# Patient Record
Sex: MALE | Race: WHITE | NOT HISPANIC OR LATINO | URBAN - METROPOLITAN AREA
[De-identification: names, ages, dates, MRNs, and addresses within clinical notes are randomized per-mention and may not be internally consistent; named-entity substitution may affect disease eponyms.]

---

## 2020-06-17 ENCOUNTER — WEB ENCOUNTER (OUTPATIENT)
Dept: URBAN - METROPOLITAN AREA CLINIC 92 | Facility: CLINIC | Age: 25
End: 2020-06-17

## 2020-06-17 ENCOUNTER — LAB OUTSIDE AN ENCOUNTER (OUTPATIENT)
Dept: URBAN - METROPOLITAN AREA CLINIC 92 | Facility: CLINIC | Age: 25
End: 2020-06-17

## 2020-06-17 ENCOUNTER — OFFICE VISIT (OUTPATIENT)
Dept: URBAN - METROPOLITAN AREA CLINIC 92 | Facility: CLINIC | Age: 25
End: 2020-06-17
Payer: COMMERCIAL

## 2020-06-17 DIAGNOSIS — K21.9 GERD: ICD-10-CM

## 2020-06-17 PROCEDURE — 99214 OFFICE O/P EST MOD 30 MIN: CPT | Performed by: INTERNAL MEDICINE

## 2020-06-17 PROCEDURE — G8427 DOCREV CUR MEDS BY ELIG CLIN: HCPCS | Performed by: INTERNAL MEDICINE

## 2020-06-17 PROCEDURE — G8417 CALC BMI ABV UP PARAM F/U: HCPCS | Performed by: INTERNAL MEDICINE

## 2020-06-17 PROCEDURE — 1036F TOBACCO NON-USER: CPT | Performed by: INTERNAL MEDICINE

## 2020-06-17 RX ORDER — OMEPRAZOLE 40 MG/1
1 CAPSULE 30 MINUTES BEFORE MORNING MEAL CAPSULE, DELAYED RELEASE ORAL ONCE A DAY
Qty: 30 | OUTPATIENT
Start: 2020-06-17

## 2020-06-17 RX ORDER — PANTOPRAZOLE SODIUM 40 MG/1
1 TABLET TABLET, DELAYED RELEASE ORAL ONCE A DAY
Status: ACTIVE | COMMUNITY

## 2020-06-17 RX ORDER — FEXOFENADINE HYDROCHLORIDE 60 MG/1
1 TABLET AS NEEDED TABLET, FILM COATED ORAL TWICE A DAY
Status: ACTIVE | COMMUNITY

## 2020-06-17 RX ORDER — SERTRALINE HYDROCHLORIDE 100 MG/1
1 TABLET TABLET ORAL ONCE A DAY
Status: ACTIVE | COMMUNITY

## 2020-06-17 NOTE — PHYSICAL EXAM GASTROINTESTINAL
Abdomen, soft, nontender, nondistended, no guarding or rigidity, no masses palpable, normal bowel sounds, Liver and Spleen, no hepatomegaly present, no hepatosplenomegaly, liver nontender, spleen not palpable, Self Exam: Abdomen soft, non-tender to palpatation, non-distended

## 2020-06-17 NOTE — HPI-TODAY'S VISIT:
This is a 24-year-old male who now presents for evaluation of GERD.    Patient has longstanding reflux symptoms since age 14.  He has tried multiple PPI in the past but has been on Protonix 40 mg daily with good control of reflux symptoms.  Recently he has noticed frequent breakthrough symptoms 2 to 3 times a week but denies any odynophagia or dysphagia.  There is no abdominal pain, N/V, hematemesis, melena, or hematochezia.  Denies any fever, chills, or constitutional symptoms including unintentional weight loss.

## 2020-06-30 ENCOUNTER — OFFICE VISIT (OUTPATIENT)
Dept: URBAN - METROPOLITAN AREA SURGERY CENTER 16 | Facility: SURGERY CENTER | Age: 25
End: 2020-06-30
Payer: COMMERCIAL

## 2020-06-30 DIAGNOSIS — K29.60 ADENOPAPILLOMATOSIS GASTRICA: ICD-10-CM

## 2020-06-30 DIAGNOSIS — K31.7 BENIGN GASTRIC POLYP: ICD-10-CM

## 2020-06-30 DIAGNOSIS — K21.0 BILE REFLUX ESOPHAGITIS: ICD-10-CM

## 2020-06-30 PROCEDURE — G8907 PT DOC NO EVENTS ON DISCHARG: HCPCS | Performed by: INTERNAL MEDICINE

## 2020-06-30 PROCEDURE — 43239 EGD BIOPSY SINGLE/MULTIPLE: CPT | Performed by: INTERNAL MEDICINE

## 2020-06-30 RX ORDER — PANTOPRAZOLE SODIUM 40 MG/1
1 TABLET TABLET, DELAYED RELEASE ORAL ONCE A DAY
Status: ACTIVE | COMMUNITY

## 2020-06-30 RX ORDER — OMEPRAZOLE 40 MG/1
1 CAPSULE 30 MINUTES BEFORE MORNING MEAL CAPSULE, DELAYED RELEASE ORAL ONCE A DAY
Qty: 30 | Status: ACTIVE | COMMUNITY
Start: 2020-06-17

## 2020-06-30 RX ORDER — FEXOFENADINE HYDROCHLORIDE 60 MG/1
1 TABLET AS NEEDED TABLET, FILM COATED ORAL TWICE A DAY
Status: ACTIVE | COMMUNITY

## 2020-06-30 RX ORDER — SERTRALINE HYDROCHLORIDE 100 MG/1
1 TABLET TABLET ORAL ONCE A DAY
Status: ACTIVE | COMMUNITY

## 2020-07-10 ENCOUNTER — ERX REFILL RESPONSE (OUTPATIENT)
Dept: URBAN - METROPOLITAN AREA CLINIC 92 | Facility: CLINIC | Age: 25
End: 2020-07-10

## 2020-07-10 RX ORDER — OMEPRAZOLE 40 MG/1
1 CAPSULE 30 MINUTES BEFORE MORNING MEAL CAPSULE, DELAYED RELEASE ORAL ONCE A DAY
Qty: 30 | Refills: 0 | OUTPATIENT

## 2020-07-10 RX ORDER — OMEPRAZOLE 40 MG/1
1 CAPSULE 30 MINUTES BEFORE MORNING MEAL ONCE A DAY ORALLY 30 DAY(S) CAPSULE, DELAYED RELEASE ORAL
Qty: 90 CAPSULE | Refills: 0 | OUTPATIENT

## 2020-07-22 ENCOUNTER — OFFICE VISIT (OUTPATIENT)
Dept: URBAN - METROPOLITAN AREA CLINIC 92 | Facility: CLINIC | Age: 25
End: 2020-07-22
Payer: COMMERCIAL

## 2020-07-22 VITALS
BODY MASS INDEX: 30.34 KG/M2 | HEART RATE: 91 BPM | WEIGHT: 224 LBS | DIASTOLIC BLOOD PRESSURE: 93 MMHG | HEIGHT: 72 IN | TEMPERATURE: 94.5 F | SYSTOLIC BLOOD PRESSURE: 144 MMHG

## 2020-07-22 DIAGNOSIS — K21.9 GERD: ICD-10-CM

## 2020-07-22 PROCEDURE — 99213 OFFICE O/P EST LOW 20 MIN: CPT | Performed by: INTERNAL MEDICINE

## 2020-07-22 PROCEDURE — G8417 CALC BMI ABV UP PARAM F/U: HCPCS | Performed by: INTERNAL MEDICINE

## 2020-07-22 PROCEDURE — 1036F TOBACCO NON-USER: CPT | Performed by: INTERNAL MEDICINE

## 2020-07-22 PROCEDURE — G8427 DOCREV CUR MEDS BY ELIG CLIN: HCPCS | Performed by: INTERNAL MEDICINE

## 2020-07-22 RX ORDER — OMEPRAZOLE 40 MG/1
1 CAPSULE 30 MINUTES BEFORE MORNING MEAL CAPSULE, DELAYED RELEASE ORAL ONCE A DAY
Qty: 90 | Refills: 3 | OUTPATIENT
Start: 2020-07-22

## 2020-07-22 RX ORDER — OMEPRAZOLE 40 MG/1
1 CAPSULE 30 MINUTES BEFORE MORNING MEAL ONCE A DAY ORALLY 30 DAY(S) CAPSULE, DELAYED RELEASE ORAL
Qty: 90 CAPSULE | Refills: 0 | Status: ACTIVE | COMMUNITY

## 2020-07-22 RX ORDER — SERTRALINE HYDROCHLORIDE 100 MG/1
1 TABLET TABLET ORAL ONCE A DAY
Status: ON HOLD | COMMUNITY

## 2020-07-22 RX ORDER — FEXOFENADINE HYDROCHLORIDE 60 MG/1
1 TABLET AS NEEDED TABLET, FILM COATED ORAL TWICE A DAY
Status: ACTIVE | COMMUNITY

## 2020-07-22 RX ORDER — PANTOPRAZOLE SODIUM 40 MG/1
1 TABLET TABLET, DELAYED RELEASE ORAL ONCE A DAY
Status: ON HOLD | COMMUNITY

## 2020-07-22 RX ORDER — SERTRALINE HYDROCHLORIDE 100 MG/1
1 TABLET TABLET, FILM COATED ORAL ONCE A DAY
Status: ACTIVE | COMMUNITY

## 2020-07-22 NOTE — HPI-TODAY'S VISIT:
This is a 24-year-old male who now presents for follow-up after EGD  Patient has longstanding reflux symptoms since age 14.  He has tried multiple PPI in the past but has been on Protonix 40 mg daily with good control of reflux symptoms .  He was recently seen for frequent breakthrough symptoms.  EGD on 06/30/2020 showed irregular GE junction and the biopsy showed reflux esophagitis.  There were fundic gland polyps and chronic gastritis without H pylori.  He has been on Prilosec 40 mg daily, before dinner with good control of reflux symptoms.

## 2021-05-14 ENCOUNTER — OFFICE VISIT (OUTPATIENT)
Dept: URBAN - METROPOLITAN AREA CLINIC 92 | Facility: CLINIC | Age: 26
End: 2021-05-14
Payer: COMMERCIAL

## 2021-05-14 DIAGNOSIS — K21.9 GERD: ICD-10-CM

## 2021-05-14 DIAGNOSIS — R06.6 HICCUPS: ICD-10-CM

## 2021-05-14 PROCEDURE — 99214 OFFICE O/P EST MOD 30 MIN: CPT | Performed by: INTERNAL MEDICINE

## 2021-05-14 RX ORDER — SERTRALINE HYDROCHLORIDE 100 MG/1
1 TABLET TABLET, FILM COATED ORAL ONCE A DAY
Status: ACTIVE | COMMUNITY

## 2021-05-14 RX ORDER — PANTOPRAZOLE SODIUM 40 MG/1
1 TABLET TABLET, DELAYED RELEASE ORAL ONCE A DAY
Status: ON HOLD | COMMUNITY

## 2021-05-14 RX ORDER — OMEPRAZOLE 40 MG/1
1 CAPSULE 30 MINUTES BEFORE MORNING MEAL ONCE A DAY ORALLY 30 DAY(S) CAPSULE, DELAYED RELEASE ORAL
Qty: 90 CAPSULE | Refills: 0 | Status: ACTIVE | COMMUNITY

## 2021-05-14 RX ORDER — OMEPRAZOLE 40 MG/1
1 CAPSULE 30 MINUTES BEFORE MORNING MEAL CAPSULE, DELAYED RELEASE ORAL ONCE A DAY
Qty: 90 | Refills: 3 | Status: ACTIVE | COMMUNITY
Start: 2020-07-22

## 2021-05-14 RX ORDER — FEXOFENADINE HYDROCHLORIDE 60 MG/1
1 TABLET AS NEEDED TABLET, FILM COATED ORAL TWICE A DAY
Status: ON HOLD | COMMUNITY

## 2021-05-14 RX ORDER — SERTRALINE HYDROCHLORIDE 100 MG/1
1 TABLET TABLET ORAL ONCE A DAY
Status: ON HOLD | COMMUNITY

## 2021-05-14 RX ORDER — OMEPRAZOLE 40 MG/1
1 CAPSULE 30 MINUTES BEFORE MORNING MEAL CAPSULE, DELAYED RELEASE ORAL ONCE A DAY
Qty: 90 | Refills: 3 | OUTPATIENT
Start: 2021-05-14

## 2021-05-14 NOTE — HPI-TODAY'S VISIT:
This is a 24-year-old male who now presents for follow-up after EGD  Patient has longstanding reflux symptoms since age 14.  He has tried multiple PPI in the past but has been on Protonix 40 mg daily .  Because he recently had 1 breakthrough symptoms on the medication, I transitioned to Prilosec 40 mg daily.  With this current medication, he has good control of reflux symptoms.  EGD on 06/30/2020 showed irregular GE junction and the biopsy showed reflux esophagitis.  There were fundic gland polyps and chronic gastritis without H pylori.      Patient reports having intermittent intractable hiccups once or twice a month that is often times triggered by alcohol.

## 2021-06-07 ENCOUNTER — TELEPHONE ENCOUNTER (OUTPATIENT)
Dept: URBAN - METROPOLITAN AREA CLINIC 92 | Facility: CLINIC | Age: 26
End: 2021-06-07

## 2021-06-18 ENCOUNTER — OFFICE VISIT (OUTPATIENT)
Dept: URBAN - METROPOLITAN AREA CLINIC 92 | Facility: CLINIC | Age: 26
End: 2021-06-18

## 2021-06-18 RX ORDER — OMEPRAZOLE 40 MG/1
1 CAPSULE 30 MINUTES BEFORE MORNING MEAL CAPSULE, DELAYED RELEASE ORAL ONCE A DAY
Qty: 90 | Refills: 3 | Status: ACTIVE | COMMUNITY
Start: 2020-07-22

## 2021-06-18 RX ORDER — PANTOPRAZOLE SODIUM 40 MG/1
1 TABLET TABLET, DELAYED RELEASE ORAL ONCE A DAY
Status: ON HOLD | COMMUNITY

## 2021-06-18 RX ORDER — SERTRALINE HYDROCHLORIDE 100 MG/1
1 TABLET TABLET ORAL ONCE A DAY
Status: ON HOLD | COMMUNITY

## 2021-06-18 RX ORDER — OMEPRAZOLE 40 MG/1
1 CAPSULE 30 MINUTES BEFORE MORNING MEAL ONCE A DAY ORALLY 30 DAY(S) CAPSULE, DELAYED RELEASE ORAL
Qty: 90 CAPSULE | Refills: 0 | Status: ACTIVE | COMMUNITY

## 2021-06-18 RX ORDER — FEXOFENADINE HYDROCHLORIDE 60 MG/1
1 TABLET AS NEEDED TABLET, FILM COATED ORAL TWICE A DAY
Status: ON HOLD | COMMUNITY

## 2021-06-18 RX ORDER — SERTRALINE HYDROCHLORIDE 100 MG/1
1 TABLET TABLET, FILM COATED ORAL ONCE A DAY
Status: ACTIVE | COMMUNITY

## 2021-06-18 RX ORDER — OMEPRAZOLE 40 MG/1
1 CAPSULE 30 MINUTES BEFORE MORNING MEAL CAPSULE, DELAYED RELEASE ORAL ONCE A DAY
Qty: 90 | Refills: 3 | Status: ACTIVE | COMMUNITY
Start: 2021-05-14

## 2021-07-22 ENCOUNTER — OFFICE VISIT (OUTPATIENT)
Dept: URBAN - METROPOLITAN AREA CLINIC 92 | Facility: CLINIC | Age: 26
End: 2021-07-22

## 2023-01-26 ENCOUNTER — OFFICE VISIT (OUTPATIENT)
Dept: URBAN - METROPOLITAN AREA CLINIC 92 | Facility: CLINIC | Age: 28
End: 2023-01-26
Payer: COMMERCIAL

## 2023-01-26 VITALS
WEIGHT: 211 LBS | HEART RATE: 122 BPM | TEMPERATURE: 97.7 F | HEIGHT: 72 IN | SYSTOLIC BLOOD PRESSURE: 144 MMHG | DIASTOLIC BLOOD PRESSURE: 98 MMHG | BODY MASS INDEX: 28.58 KG/M2

## 2023-01-26 DIAGNOSIS — R11.0 NAUSEA: ICD-10-CM

## 2023-01-26 DIAGNOSIS — K21.9 GERD: ICD-10-CM

## 2023-01-26 PROCEDURE — 99214 OFFICE O/P EST MOD 30 MIN: CPT | Performed by: PHYSICIAN ASSISTANT

## 2023-01-26 RX ORDER — IBUPROFEN 800 MG/1
1 TABLET WITH FOOD OR MILK AS NEEDED TABLET, FILM COATED ORAL
Status: ACTIVE | COMMUNITY

## 2023-01-26 RX ORDER — SUCRALFATE 1 G/10ML
10 ML ON AN EMPTY STOMACH SUSPENSION ORAL
Qty: 1200 MILLILITER | Refills: 3 | OUTPATIENT
Start: 2023-01-26 | End: 2023-05-26

## 2023-01-26 RX ORDER — PANTOPRAZOLE SODIUM 40 MG/1
1 TABLET TABLET, DELAYED RELEASE ORAL ONCE A DAY
Status: ON HOLD | COMMUNITY

## 2023-01-26 RX ORDER — OMEPRAZOLE 40 MG/1
1 CAPSULE 30 MINUTES BEFORE MORNING MEAL CAPSULE, DELAYED RELEASE ORAL ONCE A DAY
Qty: 90 | Refills: 3 | OUTPATIENT

## 2023-01-26 RX ORDER — FEXOFENADINE HYDROCHLORIDE 60 MG/1
1 TABLET AS NEEDED TABLET, FILM COATED ORAL TWICE A DAY
Status: ON HOLD | COMMUNITY

## 2023-01-26 RX ORDER — SERTRALINE HYDROCHLORIDE 100 MG/1
1 TABLET TABLET ORAL ONCE A DAY
Status: ON HOLD | COMMUNITY

## 2023-01-26 NOTE — PHYSICAL EXAM GASTROINTESTINAL
Abdomen , soft, nontender, nondistended , no guarding or rigidity , no masses palpable ,hypoactive bowel sounds , no hepatomegaly present

## 2023-01-26 NOTE — HPI-TODAY'S VISIT:
This is a 27-year-old male who now presents for follow-up, last seen 5/2021.  Patient has longstanding reflux symptoms since age 14.  He has tried multiple PPI in the past including Protonix and prilosec. On prilosec he was well controlled for over a year doing well but in August he had acute onset of epigastric pain, N/V, diarrhea that lasted for 6 weeks. Assoc 30# weight loss. Saw PCP and another GI and was told he had a virus/infection s/p antibiotics with resolution of those symptoms. He did well for months but then over the last 5 days he has had recurrent N/V, diarrhea and abd pain. The diarrhea and abd pain resolved within days but continues to have nausea and dry heeves, worse in the AM. Assoc unsettled/gurgling in the stomach, improved with prn bentyl. He transitioned from omeprazole to protonix in the winter as he felt like the omeprazole lost efficacy. On this he denies regurgitation and heartburn but notes post-prandial early satiety and fullness. Denies hematochezia or melena. No dysphagia. Weight stable since Aug loss.  Has not taken NSAIDs regularly, prn for HA.  EGD on 06/30/2020 showed irregular GE junction and the biopsy showed reflux esophagitis.  There were fundic gland polyps and chronic gastritis without H pylori.   Mom with GERD. No GI malignancy Reports recent labs and US with PCP normal

## 2023-03-01 ENCOUNTER — DASHBOARD ENCOUNTERS (OUTPATIENT)
Age: 28
End: 2023-03-01

## 2023-03-07 ENCOUNTER — OFFICE VISIT (OUTPATIENT)
Dept: URBAN - METROPOLITAN AREA TELEHEALTH 2 | Facility: TELEHEALTH | Age: 28
End: 2023-03-07
Payer: COMMERCIAL

## 2023-03-07 ENCOUNTER — TELEPHONE ENCOUNTER (OUTPATIENT)
Dept: URBAN - METROPOLITAN AREA CLINIC 5 | Facility: CLINIC | Age: 28
End: 2023-03-07

## 2023-03-07 VITALS — BODY MASS INDEX: 28.58 KG/M2 | HEIGHT: 72 IN | WEIGHT: 211 LBS

## 2023-03-07 DIAGNOSIS — K21.9 GERD: ICD-10-CM

## 2023-03-07 DIAGNOSIS — R11.0 NAUSEA: ICD-10-CM

## 2023-03-07 DIAGNOSIS — K31.84 GASTROPARESIS: ICD-10-CM

## 2023-03-07 PROBLEM — 235595009 GASTROESOPHAGEAL REFLUX DISEASE: Status: ACTIVE | Noted: 2021-05-14

## 2023-03-07 PROBLEM — 235675006: Status: ACTIVE | Noted: 2023-03-07

## 2023-03-07 PROCEDURE — 99213 OFFICE O/P EST LOW 20 MIN: CPT | Performed by: INTERNAL MEDICINE

## 2023-03-07 RX ORDER — FEXOFENADINE HYDROCHLORIDE 60 MG/1
1 TABLET AS NEEDED TABLET, FILM COATED ORAL TWICE A DAY
Status: ACTIVE | COMMUNITY

## 2023-03-07 RX ORDER — IBUPROFEN 800 MG/1
1 TABLET WITH FOOD OR MILK AS NEEDED TABLET, FILM COATED ORAL
Status: ACTIVE | COMMUNITY

## 2023-03-07 RX ORDER — OMEPRAZOLE 40 MG/1
1 CAPSULE 30 MINUTES BEFORE MORNING MEAL CAPSULE, DELAYED RELEASE ORAL ONCE A DAY
Qty: 90 | Refills: 3 | OUTPATIENT

## 2023-03-07 RX ORDER — SERTRALINE HYDROCHLORIDE 100 MG/1
1 TABLET TABLET ORAL ONCE A DAY
Status: ACTIVE | COMMUNITY

## 2023-03-07 RX ORDER — PANTOPRAZOLE SODIUM 40 MG/1
1 TABLET TABLET, DELAYED RELEASE ORAL ONCE A DAY
Status: DISCONTINUED | COMMUNITY

## 2023-03-07 RX ORDER — OMEPRAZOLE 40 MG/1
1 CAPSULE 30 MINUTES BEFORE MORNING MEAL CAPSULE, DELAYED RELEASE ORAL ONCE A DAY
Qty: 90 | Refills: 3 | Status: DISCONTINUED | COMMUNITY

## 2023-03-07 RX ORDER — SUCRALFATE 1 G/10ML
10 ML ON AN EMPTY STOMACH SUSPENSION ORAL
Qty: 1200 MILLILITER | Refills: 3 | Status: ACTIVE | COMMUNITY
Start: 2023-01-26 | End: 2023-05-26

## 2024-03-08 ENCOUNTER — TELEP (OUTPATIENT)
Dept: URBAN - METROPOLITAN AREA TELEHEALTH 2 | Facility: TELEHEALTH | Age: 29
End: 2024-03-08

## 2024-03-08 RX ORDER — SERTRALINE HYDROCHLORIDE 100 MG/1
1 TABLET TABLET ORAL ONCE A DAY
Status: ACTIVE | COMMUNITY

## 2024-03-08 RX ORDER — OMEPRAZOLE 40 MG/1
1 CAPSULE 30 MINUTES BEFORE MORNING MEAL CAPSULE, DELAYED RELEASE ORAL ONCE A DAY
Qty: 90 | Refills: 3 | OUTPATIENT

## 2024-03-08 RX ORDER — IBUPROFEN 800 MG/1
1 TABLET WITH FOOD OR MILK AS NEEDED TABLET, FILM COATED ORAL
Status: ACTIVE | COMMUNITY

## 2024-03-08 RX ORDER — OMEPRAZOLE 40 MG/1
1 CAPSULE 30 MINUTES BEFORE MORNING MEAL CAPSULE, DELAYED RELEASE ORAL ONCE A DAY
Qty: 90 | Refills: 3 | Status: ACTIVE | COMMUNITY

## 2024-03-08 RX ORDER — FEXOFENADINE HYDROCHLORIDE 60 MG/1
1 TABLET AS NEEDED TABLET, FILM COATED ORAL TWICE A DAY
Status: ACTIVE | COMMUNITY

## 2024-03-08 NOTE — HPI-TODAY'S VISIT:
This is a 28-year-old male who now presents for follow-up.  Patient has longstanding reflux symptoms since age 14.  He has tried multiple PPI in the past including Protonix and prilosec.In 2023 he was diagnosed with gastroparesis on GES, which showed borderline emptying with T-1/2 of 61 minutes and 33% of activity remaining in the stomach at 90 minutes.   He is only on PPI now and feels sign better but stomach continues to feel tight after eating. No further nausea, dr calvin or unsettled feeling. Tums prn helps with indigestion. Post-prandial fullness improving with time and has gained back a few pounds.  EGD on 06/30/2020 showed irregular GE junction and the biopsy showed reflux esophagitis.  There were fundic gland polyps and chronic gastritis without H pylori.    Mom with GERD. No GI malignancy Reports recent labs and US with PCP normal   S

## 2024-06-06 ENCOUNTER — OFFICE VISIT (OUTPATIENT)
Dept: URBAN - METROPOLITAN AREA TELEHEALTH 2 | Facility: TELEHEALTH | Age: 29
End: 2024-06-06
Payer: COMMERCIAL

## 2024-06-06 ENCOUNTER — LAB OUTSIDE AN ENCOUNTER (OUTPATIENT)
Dept: URBAN - METROPOLITAN AREA TELEHEALTH 2 | Facility: TELEHEALTH | Age: 29
End: 2024-06-06

## 2024-06-06 ENCOUNTER — TELEPHONE ENCOUNTER (OUTPATIENT)
Dept: URBAN - METROPOLITAN AREA CLINIC 92 | Facility: CLINIC | Age: 29
End: 2024-06-06

## 2024-06-06 VITALS — HEIGHT: 72 IN | BODY MASS INDEX: 28.58 KG/M2 | WEIGHT: 211 LBS

## 2024-06-06 DIAGNOSIS — K31.84 GASTROPARESIS: ICD-10-CM

## 2024-06-06 DIAGNOSIS — R11.0 NAUSEA: ICD-10-CM

## 2024-06-06 DIAGNOSIS — K21.9 GERD: ICD-10-CM

## 2024-06-06 DIAGNOSIS — R10.13 EPIGASTRIC PAIN: ICD-10-CM

## 2024-06-06 PROCEDURE — 99214 OFFICE O/P EST MOD 30 MIN: CPT | Performed by: PHYSICIAN ASSISTANT

## 2024-06-06 RX ORDER — PRAVASTATIN SODIUM 20 MG/1
TABLET ORAL
Qty: 90 TABLET | Status: ACTIVE | COMMUNITY

## 2024-06-06 RX ORDER — OMEPRAZOLE 40 MG/1
1 CAPSULE 30 MINUTES BEFORE MORNING MEAL CAPSULE, DELAYED RELEASE ORAL ONCE A DAY
Qty: 90 | Refills: 3 | Status: DISCONTINUED | COMMUNITY

## 2024-06-06 RX ORDER — FEXOFENADINE HYDROCHLORIDE 60 MG/1
1 TABLET AS NEEDED TABLET, FILM COATED ORAL TWICE A DAY
Status: ACTIVE | COMMUNITY

## 2024-06-06 RX ORDER — SERTRALINE HYDROCHLORIDE 100 MG/1
1 TABLET TABLET ORAL ONCE A DAY
Status: ACTIVE | COMMUNITY

## 2024-06-06 RX ORDER — TRAZODONE HYDROCHLORIDE 150 MG/1
TABLET ORAL
Qty: 135 TABLET | Status: ACTIVE | COMMUNITY

## 2024-06-06 RX ORDER — EZETIMIBE 10 MG/1
TABLET ORAL
Qty: 90 TABLET | Status: ACTIVE | COMMUNITY

## 2024-06-06 RX ORDER — VONOPRAZAN FUMARATE 26.72 MG/1
AS DIRECTED TABLET ORAL ONCE A DAY
Qty: 56 | Refills: 0 | OUTPATIENT
Start: 2024-06-06 | End: 2024-08-01

## 2024-06-06 RX ORDER — IBUPROFEN 800 MG/1
1 TABLET WITH FOOD OR MILK AS NEEDED TABLET, FILM COATED ORAL
Status: ACTIVE | COMMUNITY

## 2024-06-06 NOTE — HPI-TODAY'S VISIT:
This is a 28-year-old male who now presents for follow-up.  Patient has longstanding reflux symptoms since age 14.  He has tried multiple PPI in the past including Protonix and prilosec. He is on protonix now. In 2023 he was diagnosed with gastroparesis on GES, which showed borderline emptying with T-1/2 of 61 minutes and 33% of activity remaining in the stomach at 90 minutes.   His symptoms had improved on PPI  and were well controlled for over a year but now notes a recurrence of N/V X 1m, worse in the AM, increasing in time over the last week, occuring 1-2/week. Increase in GERD also-some burning into throat. He takes Ibuprofen about once/week. Assoc post-prandial fullness. No dysphagia  but some epi bloating, gas and discomfort. Assoc constipation. He used to have BMs every day and X 2 weeks having BMs every 2 days. No hematochezia or melena.   EGD on 06/30/2020 showed irregular GE junction and the biopsy showed reflux esophagitis.  There were fundic gland polyps and chronic gastritis without H pylori.    Mom with GERD. No GI malignancy

## 2024-06-07 ENCOUNTER — TELEPHONE ENCOUNTER (OUTPATIENT)
Dept: URBAN - METROPOLITAN AREA CLINIC 98 | Facility: CLINIC | Age: 29
End: 2024-06-07

## 2024-06-07 RX ORDER — SUCRALFATE 1 G/10ML
10ML ON AN EMPTY STOMACH SUSPENSION ORAL
Qty: 1200 | Refills: 3
Start: 2023-01-26 | End: 2024-10-05

## 2024-06-24 ENCOUNTER — TELEPHONE ENCOUNTER (OUTPATIENT)
Dept: URBAN - METROPOLITAN AREA CLINIC 92 | Facility: CLINIC | Age: 29
End: 2024-06-24

## 2024-06-25 ENCOUNTER — TELEPHONE ENCOUNTER (OUTPATIENT)
Dept: URBAN - METROPOLITAN AREA CLINIC 92 | Facility: CLINIC | Age: 29
End: 2024-06-25

## 2024-06-25 RX ORDER — VONOPRAZAN FUMARATE 13.36 MG/1
AS DIRECTED TABLET ORAL ONCE A DAY
Qty: 90 | Refills: 1 | OUTPATIENT
Start: 2024-06-25 | End: 2024-12-21

## 2024-06-25 RX ORDER — VONOPRAZAN FUMARATE 26.72 MG/1
AS DIRECTED TABLET ORAL ONCE A DAY
Qty: 56 | Refills: 0
Start: 2024-06-06 | End: 2024-08-20

## 2024-07-18 ENCOUNTER — CLAIMS CREATED FROM THE CLAIM WINDOW (OUTPATIENT)
Dept: URBAN - METROPOLITAN AREA SURGERY CENTER 16 | Facility: SURGERY CENTER | Age: 29
End: 2024-07-18
Payer: COMMERCIAL

## 2024-07-18 ENCOUNTER — CLAIMS CREATED FROM THE CLAIM WINDOW (OUTPATIENT)
Dept: URBAN - METROPOLITAN AREA CLINIC 4 | Facility: CLINIC | Age: 29
End: 2024-07-18
Payer: COMMERCIAL

## 2024-07-18 DIAGNOSIS — R10.13 EPIGASTRIC ABDOMINAL PAIN: ICD-10-CM

## 2024-07-18 DIAGNOSIS — K29.70 GASTRITIS: ICD-10-CM

## 2024-07-18 DIAGNOSIS — R10.13 ABDOMINAL DISCOMFORT, EPIGASTRIC: ICD-10-CM

## 2024-07-18 DIAGNOSIS — R11.2 NAUSEA WITH VOMITING: ICD-10-CM

## 2024-07-18 DIAGNOSIS — K31.7 BENIGN GASTRIC POLYP: ICD-10-CM

## 2024-07-18 DIAGNOSIS — K21.9 GASTRIC REFLUX: ICD-10-CM

## 2024-07-18 DIAGNOSIS — R11.2 ACUTE NAUSEA WITH NONBILIOUS VOMITING: ICD-10-CM

## 2024-07-18 DIAGNOSIS — K31.89 OTHER DISEASES OF STOMACH AND DUODENUM: ICD-10-CM

## 2024-07-18 DIAGNOSIS — K31.7 POLYP OF STOMACH AND DUODENUM: ICD-10-CM

## 2024-07-18 PROCEDURE — 88305 TISSUE EXAM BY PATHOLOGIST: CPT | Performed by: PATHOLOGY

## 2024-07-18 PROCEDURE — 88312 SPECIAL STAINS GROUP 1: CPT | Performed by: PATHOLOGY

## 2024-07-18 PROCEDURE — 00731 ANES UPR GI NDSC PX NOS: CPT | Performed by: ANESTHESIOLOGY

## 2024-07-18 PROCEDURE — 43239 EGD BIOPSY SINGLE/MULTIPLE: CPT | Performed by: INTERNAL MEDICINE

## 2024-07-18 PROCEDURE — 00731 ANES UPR GI NDSC PX NOS: CPT | Performed by: ANESTHESIOLOGIST ASSISTANT

## 2024-07-18 RX ORDER — TRAZODONE HYDROCHLORIDE 150 MG/1
TABLET ORAL
Qty: 135 TABLET | Status: ACTIVE | COMMUNITY

## 2024-07-18 RX ORDER — SUCRALFATE 1 G/10ML
10ML ON AN EMPTY STOMACH SUSPENSION ORAL
Qty: 1200 | Refills: 3 | Status: ACTIVE | COMMUNITY
Start: 2023-01-26 | End: 2024-10-05

## 2024-07-18 RX ORDER — SERTRALINE HYDROCHLORIDE 100 MG/1
1 TABLET TABLET ORAL ONCE A DAY
Status: ACTIVE | COMMUNITY

## 2024-07-18 RX ORDER — VONOPRAZAN FUMARATE 13.36 MG/1
AS DIRECTED TABLET ORAL ONCE A DAY
Qty: 90 | Refills: 1 | Status: ACTIVE | COMMUNITY
Start: 2024-06-25 | End: 2024-12-21

## 2024-07-18 RX ORDER — PRAVASTATIN SODIUM 20 MG/1
TABLET ORAL
Qty: 90 TABLET | Status: ACTIVE | COMMUNITY

## 2024-07-18 RX ORDER — FEXOFENADINE HYDROCHLORIDE 60 MG/1
1 TABLET AS NEEDED TABLET, FILM COATED ORAL TWICE A DAY
Status: ACTIVE | COMMUNITY

## 2024-07-18 RX ORDER — EZETIMIBE 10 MG/1
TABLET ORAL
Qty: 90 TABLET | Status: ACTIVE | COMMUNITY

## 2024-07-18 RX ORDER — IBUPROFEN 800 MG/1
1 TABLET WITH FOOD OR MILK AS NEEDED TABLET, FILM COATED ORAL
Status: ACTIVE | COMMUNITY

## 2024-07-18 RX ORDER — VONOPRAZAN FUMARATE 26.72 MG/1
AS DIRECTED TABLET ORAL ONCE A DAY
Qty: 56 | Refills: 0 | Status: ACTIVE | COMMUNITY
Start: 2024-06-06 | End: 2024-08-20

## 2024-08-16 ENCOUNTER — OFFICE VISIT (OUTPATIENT)
Dept: URBAN - METROPOLITAN AREA TELEHEALTH 2 | Facility: TELEHEALTH | Age: 29
End: 2024-08-16
Payer: COMMERCIAL

## 2024-08-16 VITALS — BODY MASS INDEX: 28.58 KG/M2 | HEIGHT: 72 IN | WEIGHT: 211 LBS

## 2024-08-16 DIAGNOSIS — K21.9 GERD: ICD-10-CM

## 2024-08-16 DIAGNOSIS — R10.13 EPIGASTRIC PAIN: ICD-10-CM

## 2024-08-16 DIAGNOSIS — K59.09 CHRONIC CONSTIPATION: ICD-10-CM

## 2024-08-16 DIAGNOSIS — R11.0 NAUSEA: ICD-10-CM

## 2024-08-16 PROCEDURE — 99213 OFFICE O/P EST LOW 20 MIN: CPT | Performed by: PHYSICIAN ASSISTANT

## 2024-08-16 RX ORDER — TRAZODONE HYDROCHLORIDE 150 MG/1
TABLET ORAL
Qty: 135 TABLET | Status: ACTIVE | COMMUNITY

## 2024-08-16 RX ORDER — EZETIMIBE 10 MG/1
TABLET ORAL
Qty: 90 TABLET | Status: ACTIVE | COMMUNITY

## 2024-08-16 RX ORDER — FEXOFENADINE HYDROCHLORIDE 60 MG/1
1 TABLET AS NEEDED TABLET, FILM COATED ORAL TWICE A DAY
Status: ACTIVE | COMMUNITY

## 2024-08-16 RX ORDER — VONOPRAZAN FUMARATE 26.72 MG/1
AS DIRECTED TABLET ORAL ONCE A DAY
Qty: 56 | Refills: 0 | Status: ACTIVE | COMMUNITY
Start: 2024-06-06 | End: 2024-08-20

## 2024-08-16 RX ORDER — IBUPROFEN 800 MG/1
1 TABLET WITH FOOD OR MILK AS NEEDED TABLET, FILM COATED ORAL
Status: ACTIVE | COMMUNITY

## 2024-08-16 RX ORDER — SERTRALINE HYDROCHLORIDE 100 MG/1
1 TABLET TABLET ORAL ONCE A DAY
Status: ACTIVE | COMMUNITY

## 2024-08-16 RX ORDER — SUCRALFATE 1 G/10ML
10ML ON AN EMPTY STOMACH SUSPENSION ORAL
Qty: 1200 | Refills: 3
Start: 2023-01-26 | End: 2024-12-14

## 2024-08-16 RX ORDER — VONOPRAZAN FUMARATE 13.36 MG/1
AS DIRECTED TABLET ORAL ONCE A DAY
Qty: 90 | Refills: 1
Start: 2024-06-25 | End: 2025-02-12

## 2024-08-16 RX ORDER — SUCRALFATE 1 G/10ML
10ML ON AN EMPTY STOMACH SUSPENSION ORAL
Qty: 1200 | Refills: 3 | Status: ACTIVE | COMMUNITY
Start: 2023-01-26 | End: 2024-10-05

## 2024-08-16 RX ORDER — VONOPRAZAN FUMARATE 13.36 MG/1
AS DIRECTED TABLET ORAL ONCE A DAY
Qty: 90 | Refills: 1 | Status: ACTIVE | COMMUNITY
Start: 2024-06-25 | End: 2024-12-21

## 2024-08-16 RX ORDER — PRAVASTATIN SODIUM 20 MG/1
TABLET ORAL
Qty: 90 TABLET | Status: ACTIVE | COMMUNITY

## 2024-08-16 NOTE — HPI-TODAY'S VISIT:
This is a 29-year-old male who now presents for follow-up of N/V  Patient has longstanding reflux symptoms since age 14.  He has tried multiple PPI in the past including Protonix and prilosec. In 2023 he was diagnosed with gastroparesis on GES, which showed borderline emptying with T-1/2 of 61 minutes and 33% of activity remaining in the stomach at 90 minutes.   His symptoms had improved on PPI  and were well controlled for over a year but seen recently with recurrent N/V X 1m, worse in the AM with assoc GERD and post prandial fullness, gas, bloating. Was using Ibuprofen about once/week.Assoc constipation w/ BMs every 2 days. No hematochezia or melena.   EGD on 06/30/2020 showed irregular GE junction and the biopsy showed reflux esophagitis.  There were fundic gland polyps and chronic gastritis without H pylori.   Repeat EGD 7/2024 showed non-erosive gastritis, fundic gland polyps, bx neg HP  He was given voqeuzna and gastroparesis diet and notes sign improvement in sx. No further GERD sx and vomiting resolved and nausea sign improved. Continues to have some AM bloating but is working on gastroparesis diet. Some gas and eructation at times. Drinks soda daily and uses straws. BMs now daily with increased water.   Mom with GERD. No GI malignancy

## 2024-08-21 ENCOUNTER — TELEPHONE ENCOUNTER (OUTPATIENT)
Dept: URBAN - METROPOLITAN AREA CLINIC 92 | Facility: CLINIC | Age: 29
End: 2024-08-21

## 2024-08-21 RX ORDER — VONOPRAZAN FUMARATE 26.72 MG/1
AS DIRECTED TABLET ORAL ONCE A DAY
Qty: 56 | Refills: 0 | OUTPATIENT
Start: 2024-08-21 | End: 2024-10-16

## 2024-10-14 NOTE — HPI-TODAY'S VISIT:
This is a 27-year-old male who now presents for follow-up.  Patient has longstanding reflux symptoms since age 14.  He has tried multiple PPI in the past including Protonix and prilosec. On prilosec he was well controlled for over a year doing well but in August he had acute onset of epigastric pain, N/V, diarrhea that lasted for 6 weeks. Assoc 30# weight loss. Saw PCP and another GI and was told he had a virus/infection s/p antibiotics with resolution of those symptoms. He did well for months but then in Jan had recurrent N/V, diarrhea and abd pain. The diarrhea and abd pain resolved within days but continued to have nausea and dry heeves, worse in the AM. Assoc unsettled/gurgling in the stomach, improved with prn bentyl. He transitioned from omeprazole to protonix in the winter as he felt like the omeprazole lost efficacy. On this he denied regurgitation and heartburn but noted post-prandial early satiety and fullness.   Advised to stop nsaids, c/w PPI, added carafate and ordered GES which showed borderline emptying with1.  Borderline normal gastric emptying with a T-1/2 of 61 minutes and 33% of activity remaining in the stomach at 90 minutes.   He is only on PPI now and feels sign better but stomach continues to feel tight after eating. No further nausea, dr herbes or unsettled feeling. Tums prn helps with indigestion. Post-prandial fullness improving with time and has gained back a few pounds.  EGD on 06/30/2020 showed irregular GE junction and the biopsy showed reflux esophagitis.  There were fundic gland polyps and chronic gastritis without H pylori.    Mom with GERD. No GI malignancy Reports recent labs and US with PCP swetha 1

## 2024-12-28 ENCOUNTER — TELEPHONE ENCOUNTER (OUTPATIENT)
Dept: URBAN - METROPOLITAN AREA CLINIC 92 | Facility: CLINIC | Age: 29
End: 2024-12-28

## 2024-12-28 RX ORDER — VONOPRAZAN FUMARATE 13.36 MG/1
AS DIRECTED TABLET ORAL ONCE A DAY
Qty: 90 | Refills: 1
Start: 2024-06-25 | End: 2025-06-26

## 2025-01-08 NOTE — PHYSICAL EXAM CHEST:
no lesions,  no deformities,  no traumatic injuries,  no significant scars are present,  chest wall non-tender,  no masses present, breathing is unlabored without accessory muscle use, normal breath sounds the day, especially before you eat and after you use the bathroom.  Stay away from people who have illnesses that you might catch, such as the flu or a cold.  Clean cuts and scrapes right away with clean water. Wash them daily until they are healed.  Keep track of your temperature, if your doctor recommends it.  When should you call for help?   Call 911  anytime you think you may need emergency care. For example, call if:    You passed out (lost consciousness).   Call your doctor now or seek immediate medical care if:    You have a fever.     You have abnormal bleeding.     You have new or worse pain.     You think you have an infection.     You have new symptoms, such as a cough, belly pain, vomiting, diarrhea, or a rash.   Watch closely for changes in your health, and be sure to contact your doctor if:    You are much more tired than usual.     You have swollen glands in your armpits, groin, or neck.     You do not get better as expected.   Where can you learn more?  Go to https://www.[employer].net/patientEd and enter C115 to learn more about \"Cancer Treatment and Side Effects: Care Instructions.\"  Current as of: October 24, 2023  Content Version: 14.3  © 2024 [employer].   Care instructions adapted under license by [employer]. If you have questions about a medical condition or this instruction, always ask your healthcare professional. [employer], disclaims any warranty or liability for your use of this information.       atezolizumab  Pronunciation:  GABRIELA ABERNATHY ue mab  Brand:  Tecentriq  What is the most important information I should know about atezolizumab?  Atezolizumab affects your immune system and may cause it to attack normal healthy tissues or organs, leading to serious or life-threatening medical problems.  Call your doctor at once if you have new or worsening symptoms such as:  chest pain, cough, breathing problems, stomach pain, vomiting, changes in appetite or weight,  over-the-counter medicines, vitamins, and herbal products. Tell your doctor about all your current medicines and any medicine you start or stop using.  Where can I get more information?  Your pharmacist can provide more information about atezolizumab.  Remember, keep this and all other medicines out of the reach of children, never share your medicines with others, and use this medication only for the indication prescribed.   Every effort has been made to ensure that the information provided by [employer]. [employer]') is accurate, up-to-date, and complete, but no guarantee is made to that effect. Drug information contained herein may be time sensitive. [employer] information has been compiled for use by healthcare practitioners and consumers in the [place] and therefore [employer] does not warrant that uses outside of the [place] are appropriate, unless specifically indicated otherwise. [employer] drug information does not endorse drugs, diagnose patients or recommend therapy. [employer] drug information is an informational resource designed to assist licensed healthcare practitioners in caring for their patients and/or to serve consumers viewing this service as a supplement to, and not a substitute for, the expertise, skill, knowledge and judgment of healthcare practitioners. The absence of a warning for a given drug or drug combination in no way should be construed to indicate that the drug or drug combination is safe, effective or appropriate for any given patient. [employer] does not assume any responsibility for any aspect of healthcare administered with the aid of information [employer] provides. The information contained herein is not intended to cover all possible uses, directions, precautions, warnings, drug interactions, allergic reactions, or adverse effects. If you have questions about the drugs you are taking, check with your doctor, nurse or pharmacist.  Copyright 7499-1525 [employer] Version:

## 2025-01-27 ENCOUNTER — TELEPHONE ENCOUNTER (OUTPATIENT)
Dept: URBAN - METROPOLITAN AREA CLINIC 92 | Facility: CLINIC | Age: 30
End: 2025-01-27

## 2025-01-27 RX ORDER — VONOPRAZAN FUMARATE 26.72 MG/1
1 TABLET TABLET ORAL ONCE A DAY
Qty: 90 TABLET | Refills: 1
Start: 2024-08-21 | End: 2025-07-26

## 2025-04-08 ENCOUNTER — TELEPHONE ENCOUNTER (OUTPATIENT)
Dept: URBAN - METROPOLITAN AREA CLINIC 92 | Facility: CLINIC | Age: 30
End: 2025-04-08

## 2025-04-08 RX ORDER — VONOPRAZAN FUMARATE 26.72 MG/1
1 TABLET TABLET ORAL ONCE A DAY
Qty: 90 TABLET | Refills: 1
Start: 2024-08-21 | End: 2025-10-05